# Patient Record
Sex: FEMALE | Race: WHITE | Employment: OTHER | URBAN - METROPOLITAN AREA
[De-identification: names, ages, dates, MRNs, and addresses within clinical notes are randomized per-mention and may not be internally consistent; named-entity substitution may affect disease eponyms.]

---

## 2017-01-12 PROBLEM — F17.200 TOBACCO DEPENDENCE: Status: ACTIVE | Noted: 2017-01-12

## 2018-01-29 ENCOUNTER — TELEPHONE (OUTPATIENT)
Dept: FAMILY MEDICINE CLINIC | Age: 33
End: 2018-01-29

## 2018-01-29 DIAGNOSIS — R19.5 LOOSE STOOLS: Primary | ICD-10-CM

## 2019-04-17 PROBLEM — R39.15 URGENCY OF URINATION: Status: ACTIVE | Noted: 2018-01-08

## 2019-04-17 PROBLEM — M62.89 HIGH-TONE PELVIC FLOOR DYSFUNCTION: Status: ACTIVE | Noted: 2018-02-26

## 2019-04-17 PROBLEM — N94.89 HIGH-TONE PELVIC FLOOR DYSFUNCTION: Status: ACTIVE | Noted: 2018-02-26

## 2019-04-17 PROBLEM — N39.46 MIXED STRESS AND URGE URINARY INCONTINENCE: Status: ACTIVE | Noted: 2018-02-26

## 2019-04-17 PROBLEM — N94.10 DYSPAREUNIA, FEMALE: Status: ACTIVE | Noted: 2018-01-08

## 2019-04-17 PROBLEM — R55 POSTURAL DIZZINESS WITH NEAR SYNCOPE: Status: ACTIVE | Noted: 2019-04-17

## 2019-04-17 PROBLEM — R35.0 FREQUENCY OF URINATION: Status: ACTIVE | Noted: 2018-03-29

## 2019-04-17 PROBLEM — R42 POSTURAL DIZZINESS WITH NEAR SYNCOPE: Status: ACTIVE | Noted: 2019-04-17

## 2019-04-17 PROBLEM — N39.0 RECURRENT URINARY TRACT INFECTION: Status: ACTIVE | Noted: 2018-01-30

## 2019-04-17 PROBLEM — R00.1 BRADYCARDIA: Status: ACTIVE | Noted: 2019-04-17

## 2019-04-18 PROBLEM — R00.1 SYMPTOMATIC SINUS BRADYCARDIA: Status: ACTIVE | Noted: 2019-04-18

## 2019-04-24 PROBLEM — Z95.0 S/P PLACEMENT OF CARDIAC PACEMAKER: Status: ACTIVE | Noted: 2019-04-24

## 2019-04-26 ENCOUNTER — TELEPHONE (OUTPATIENT)
Dept: FAMILY MEDICINE CLINIC | Age: 34
End: 2019-04-26

## 2019-06-11 ENCOUNTER — TELEPHONE (OUTPATIENT)
Dept: FAMILY MEDICINE CLINIC | Age: 34
End: 2019-06-11

## 2019-06-11 DIAGNOSIS — Z95.0 S/P PLACEMENT OF CARDIAC PACEMAKER: ICD-10-CM

## 2019-06-11 DIAGNOSIS — R00.1 BRADYCARDIA: ICD-10-CM

## 2019-06-11 DIAGNOSIS — K90.0 CELIAC DISEASE: Primary | ICD-10-CM

## 2019-10-08 PROBLEM — N93.9 ABNORMAL UTERINE BLEEDING: Status: RESOLVED | Noted: 2019-07-18 | Resolved: 2019-10-08

## 2019-10-08 PROBLEM — R35.0 FREQUENCY OF URINATION: Status: RESOLVED | Noted: 2018-03-29 | Resolved: 2019-10-08

## 2019-10-08 PROBLEM — R39.15 URGENCY OF URINATION: Chronic | Status: ACTIVE | Noted: 2018-01-08

## 2019-10-08 PROBLEM — Z87.410 HISTORY OF CERVICAL DYSPLASIA: Status: RESOLVED | Noted: 2019-07-18 | Resolved: 2019-10-08

## 2019-10-08 PROBLEM — Z95.0 PACEMAKER: Status: ACTIVE | Noted: 2019-06-27

## 2019-10-08 PROBLEM — R55 SYNCOPE AND COLLAPSE: Status: RESOLVED | Noted: 2019-04-17 | Resolved: 2019-10-08

## 2019-10-08 PROBLEM — R42 LIGHTHEADEDNESS: Status: RESOLVED | Noted: 2019-07-18 | Resolved: 2019-10-08

## 2019-10-08 PROBLEM — F32.A DEPRESSION: Status: ACTIVE | Noted: 2019-07-18

## 2019-10-08 PROBLEM — R42 POSTURAL DIZZINESS WITH NEAR SYNCOPE: Status: RESOLVED | Noted: 2019-04-17 | Resolved: 2019-10-08

## 2019-10-08 PROBLEM — R42 LIGHTHEADEDNESS: Status: ACTIVE | Noted: 2019-07-18

## 2019-10-08 PROBLEM — R55 POSTURAL DIZZINESS WITH NEAR SYNCOPE: Status: RESOLVED | Noted: 2019-04-17 | Resolved: 2019-10-08

## 2019-10-08 PROBLEM — Z87.410 HISTORY OF CERVICAL DYSPLASIA: Status: ACTIVE | Noted: 2019-07-18

## 2020-01-14 PROBLEM — F33.1 MAJOR DEPRESSIVE DISORDER, RECURRENT EPISODE, MODERATE (HCC): Status: ACTIVE | Noted: 2019-12-19

## 2020-01-14 PROBLEM — M62.89 HIGH-TONE PELVIC FLOOR DYSFUNCTION: Status: RESOLVED | Noted: 2018-02-26 | Resolved: 2020-01-14

## 2020-01-14 PROBLEM — N94.10 DYSPAREUNIA, FEMALE: Status: RESOLVED | Noted: 2018-01-08 | Resolved: 2020-01-14

## 2020-01-14 PROBLEM — N94.89 HIGH-TONE PELVIC FLOOR DYSFUNCTION: Status: RESOLVED | Noted: 2018-02-26 | Resolved: 2020-01-14

## 2020-01-14 PROBLEM — R31.29 HEMATURIA, MICROSCOPIC: Status: RESOLVED | Noted: 2018-01-30 | Resolved: 2020-01-14

## 2020-05-18 ENCOUNTER — TELEPHONE (OUTPATIENT)
Dept: FAMILY MEDICINE CLINIC | Age: 35
End: 2020-05-18

## 2020-06-22 PROBLEM — R56.9 SEIZURE-LIKE ACTIVITY (HCC): Status: ACTIVE | Noted: 2020-06-22

## 2020-06-25 ENCOUNTER — TELEPHONE (OUTPATIENT)
Dept: FAMILY MEDICINE CLINIC | Age: 35
End: 2020-06-25

## 2020-06-25 DIAGNOSIS — F12.90 MARIJUANA USE: ICD-10-CM

## 2020-06-25 RX ORDER — BUTALBITAL, ACETAMINOPHEN AND CAFFEINE 50; 325; 40 MG/1; MG/1; MG/1
1 TABLET ORAL EVERY 4 HOURS PRN
Qty: 20 TABLET | Refills: 0 | Status: SHIPPED | OUTPATIENT
Start: 2020-06-25 | End: 2020-07-31 | Stop reason: SDUPTHER

## 2020-07-08 PROBLEM — G37.9 DEMYELINATING DISORDER (HCC): Status: ACTIVE | Noted: 2020-07-08

## 2020-09-02 ENCOUNTER — TELEPHONE (OUTPATIENT)
Dept: FAMILY MEDICINE CLINIC | Age: 35
End: 2020-09-02

## 2020-09-02 RX ORDER — FLUCONAZOLE 100 MG/1
100 TABLET ORAL DAILY
Qty: 3 TABLET | Refills: 0 | Status: SHIPPED | OUTPATIENT
Start: 2020-09-02 | End: 2020-09-05

## 2023-07-05 ENCOUNTER — OFFICE VISIT (OUTPATIENT)
Dept: FAMILY MEDICINE CLINIC | Age: 38
End: 2023-07-05
Payer: MEDICARE

## 2023-07-05 VITALS
SYSTOLIC BLOOD PRESSURE: 106 MMHG | WEIGHT: 185.6 LBS | TEMPERATURE: 96.8 F | BODY MASS INDEX: 29.83 KG/M2 | HEIGHT: 66 IN | OXYGEN SATURATION: 94 % | DIASTOLIC BLOOD PRESSURE: 64 MMHG | HEART RATE: 95 BPM

## 2023-07-05 DIAGNOSIS — Z13.9 ENCOUNTER FOR SCREENING INVOLVING SOCIAL DETERMINANTS OF HEALTH (SDOH): ICD-10-CM

## 2023-07-05 DIAGNOSIS — Z00.00 INITIAL MEDICARE ANNUAL WELLNESS VISIT: Primary | ICD-10-CM

## 2023-07-05 DIAGNOSIS — Z95.0 PACEMAKER: ICD-10-CM

## 2023-07-05 DIAGNOSIS — G40.209 PARTIAL SYMPTOMATIC EPILEPSY WITH COMPLEX PARTIAL SEIZURES, NOT INTRACTABLE, WITHOUT STATUS EPILEPTICUS (HCC): ICD-10-CM

## 2023-07-05 DIAGNOSIS — F33.1 MAJOR DEPRESSIVE DISORDER, RECURRENT EPISODE, MODERATE (HCC): ICD-10-CM

## 2023-07-05 PROBLEM — I49.5 SICK SINUS SYNDROME (HCC): Status: RESOLVED | Noted: 2019-04-24 | Resolved: 2023-07-05

## 2023-07-05 PROBLEM — F31.60 BIPOLAR AFFECTIVE DISORDER, CURRENT EPISODE MIXED (HCC): Status: ACTIVE | Noted: 2021-03-16

## 2023-07-05 PROBLEM — R00.1 BRADYCARDIA: Status: RESOLVED | Noted: 2019-04-17 | Resolved: 2023-07-05

## 2023-07-05 PROBLEM — G37.9 DEMYELINATING DISORDER (HCC): Status: RESOLVED | Noted: 2020-07-08 | Resolved: 2023-07-05

## 2023-07-05 PROCEDURE — G0438 PPPS, INITIAL VISIT: HCPCS | Performed by: NURSE PRACTITIONER

## 2023-07-05 RX ORDER — VENLAFAXINE HYDROCHLORIDE 75 MG/1
CAPSULE, EXTENDED RELEASE ORAL
COMMUNITY
Start: 2023-06-22

## 2023-07-05 RX ORDER — VENLAFAXINE HYDROCHLORIDE 150 MG/1
CAPSULE, EXTENDED RELEASE ORAL
COMMUNITY
Start: 2023-06-27

## 2023-07-05 RX ORDER — OXCARBAZEPINE 300 MG/1
TABLET, FILM COATED ORAL
COMMUNITY
Start: 2023-06-22

## 2023-07-05 RX ORDER — LACOSAMIDE 50 MG/1
TABLET ORAL
COMMUNITY
Start: 2023-06-28

## 2023-07-05 RX ORDER — ARIPIPRAZOLE 15 MG/1
TABLET ORAL
COMMUNITY
Start: 2023-07-04

## 2023-07-05 SDOH — ECONOMIC STABILITY: HOUSING INSECURITY
IN THE LAST 12 MONTHS, WAS THERE A TIME WHEN YOU DID NOT HAVE A STEADY PLACE TO SLEEP OR SLEPT IN A SHELTER (INCLUDING NOW)?: NO

## 2023-07-05 SDOH — ECONOMIC STABILITY: INCOME INSECURITY: HOW HARD IS IT FOR YOU TO PAY FOR THE VERY BASICS LIKE FOOD, HOUSING, MEDICAL CARE, AND HEATING?: HARD

## 2023-07-05 SDOH — ECONOMIC STABILITY: FOOD INSECURITY: WITHIN THE PAST 12 MONTHS, YOU WORRIED THAT YOUR FOOD WOULD RUN OUT BEFORE YOU GOT MONEY TO BUY MORE.: NEVER TRUE

## 2023-07-05 SDOH — ECONOMIC STABILITY: FOOD INSECURITY: WITHIN THE PAST 12 MONTHS, THE FOOD YOU BOUGHT JUST DIDN'T LAST AND YOU DIDN'T HAVE MONEY TO GET MORE.: NEVER TRUE

## 2023-07-05 ASSESSMENT — PATIENT HEALTH QUESTIONNAIRE - PHQ9
8. MOVING OR SPEAKING SO SLOWLY THAT OTHER PEOPLE COULD HAVE NOTICED. OR THE OPPOSITE, BEING SO FIGETY OR RESTLESS THAT YOU HAVE BEEN MOVING AROUND A LOT MORE THAN USUAL: 0
1. LITTLE INTEREST OR PLEASURE IN DOING THINGS: 0
4. FEELING TIRED OR HAVING LITTLE ENERGY: 0
3. TROUBLE FALLING OR STAYING ASLEEP: 0
9. THOUGHTS THAT YOU WOULD BE BETTER OFF DEAD, OR OF HURTING YOURSELF: 0
6. FEELING BAD ABOUT YOURSELF - OR THAT YOU ARE A FAILURE OR HAVE LET YOURSELF OR YOUR FAMILY DOWN: 0
SUM OF ALL RESPONSES TO PHQ QUESTIONS 1-9: 0
7. TROUBLE CONCENTRATING ON THINGS, SUCH AS READING THE NEWSPAPER OR WATCHING TELEVISION: 0
2. FEELING DOWN, DEPRESSED OR HOPELESS: 0
SUM OF ALL RESPONSES TO PHQ QUESTIONS 1-9: 0
SUM OF ALL RESPONSES TO PHQ9 QUESTIONS 1 & 2: 0
5. POOR APPETITE OR OVEREATING: 0
10. IF YOU CHECKED OFF ANY PROBLEMS, HOW DIFFICULT HAVE THESE PROBLEMS MADE IT FOR YOU TO DO YOUR WORK, TAKE CARE OF THINGS AT HOME, OR GET ALONG WITH OTHER PEOPLE: 0
SUM OF ALL RESPONSES TO PHQ QUESTIONS 1-9: 0
SUM OF ALL RESPONSES TO PHQ QUESTIONS 1-9: 0

## 2023-07-05 ASSESSMENT — LIFESTYLE VARIABLES
HOW MANY STANDARD DRINKS CONTAINING ALCOHOL DO YOU HAVE ON A TYPICAL DAY: PATIENT DOES NOT DRINK
HOW OFTEN DO YOU HAVE A DRINK CONTAINING ALCOHOL: NEVER

## 2023-07-05 NOTE — PROGRESS NOTES
Visit Information    Have you changed or started any medications since your last visit including any over-the-counter medicines, vitamins, or herbal medicines? no   Have you stopped taking any of your medications? Is so, why? -  no  Are you having any side effects from any of your medications? - no    Have you seen any other physician or provider since your last visit?  no   Have you had any other diagnostic tests since your last visit?  no   Have you been seen in the emergency room and/or had an admission in a hospital since we last saw you?  no   Have you had your routine dental cleaning in the past 6 months?  no     Do you have an active MyChart account? If no, what is the barrier?   Yes    Patient Care Team:  Elia Kussmaul, PA-C as PCP - General  Elia Kussmaul, PA-C as PCP - Empaneled Provider    Medical History Review  Past Medical, Family, and Social History reviewed and  contribute to the patient presenting condition    Health Maintenance   Topic Date Due    COVID-19 Vaccine (1) Never done    Varicella vaccine (1 of 2 - 2-dose childhood series) Never done    Pneumococcal 0-64 years Vaccine (1 - PCV) Never done    DTaP/Tdap/Td vaccine (6 - Tdap) 10/18/1996    Depression Monitoring  Never done    Hepatitis C screen  Never done    Flu vaccine (1) 08/01/2023    Hib vaccine  Completed    HIV screen  Completed    Hepatitis A vaccine  Aged Out    Meningococcal (ACWY) vaccine  Aged Out    Hepatitis B vaccine  Discontinued    Cervical cancer screen  Discontinued

## 2023-07-05 NOTE — PATIENT INSTRUCTIONS
liability for your use of this information. Personalized Preventive Plan for Theodore Minus - 7/5/2023  Medicare offers a range of preventive health benefits. Some of the tests and screenings are paid in full while other may be subject to a deductible, co-insurance, and/or copay. Some of these benefits include a comprehensive review of your medical history including lifestyle, illnesses that may run in your family, and various assessments and screenings as appropriate. After reviewing your medical record and screening and assessments performed today your provider may have ordered immunizations, labs, imaging, and/or referrals for you. A list of these orders (if applicable) as well as your Preventive Care list are included within your After Visit Summary for your review. Other Preventive Recommendations:    A preventive eye exam performed by an eye specialist is recommended every 1-2 years to screen for glaucoma; cataracts, macular degeneration, and other eye disorders. A preventive dental visit is recommended every 6 months. Try to get at least 150 minutes of exercise per week or 10,000 steps per day on a pedometer . Order or download the FREE \"Exercise & Physical Activity: Your Everyday Guide\" from [employer]. Call 5-675.746.8386 or search [employer] online. You need 1386-1767 mg of calcium and 7164-4281 IU of vitamin D per day. It is possible to meet your calcium requirement with diet alone, but a vitamin D supplement is usually necessary to meet this goal.  When exposed to the sun, use a sunscreen that protects against both UVA and UVB radiation with an SPF of 30 or greater. Reapply every 2 to 3 hours or after sweating, drying off with a towel, or swimming. Always wear a seat belt when traveling in a car. Always wear a helmet when riding a bicycle or motorcycle.

## 2023-07-21 ENCOUNTER — PATIENT MESSAGE (OUTPATIENT)
Dept: FAMILY MEDICINE CLINIC | Age: 38
End: 2023-07-21

## 2023-07-22 NOTE — TELEPHONE ENCOUNTER
From: Cong Self  To: Angela Lundborg  Sent: 7/21/2023 9:47 PM EDT  Subject: Head itching     Hello. I have been having scalp itching for over a month. I have tried Head & Shoulders and not sure what else to try.  Thank you

## 2023-08-02 ENCOUNTER — OFFICE VISIT (OUTPATIENT)
Dept: FAMILY MEDICINE CLINIC | Age: 38
End: 2023-08-02
Payer: MEDICARE

## 2023-08-02 ENCOUNTER — HOSPITAL ENCOUNTER (OUTPATIENT)
Age: 38
Setting detail: SPECIMEN
Discharge: HOME | End: 2023-08-02

## 2023-08-02 VITALS
HEIGHT: 66 IN | WEIGHT: 182 LBS | DIASTOLIC BLOOD PRESSURE: 66 MMHG | TEMPERATURE: 97 F | OXYGEN SATURATION: 95 % | BODY MASS INDEX: 29.25 KG/M2 | SYSTOLIC BLOOD PRESSURE: 110 MMHG | HEART RATE: 74 BPM

## 2023-08-02 DIAGNOSIS — Z12.4 PAP SMEAR FOR CERVICAL CANCER SCREENING: Primary | ICD-10-CM

## 2023-08-02 DIAGNOSIS — N76.0 ACUTE VAGINITIS: ICD-10-CM

## 2023-08-02 LAB
CANDIDA SPECIES: NEGATIVE
GARDNERELLA VAGINALIS: NEGATIVE
SOURCE: NORMAL
TRICHOMONAS: NEGATIVE

## 2023-08-02 PROCEDURE — G0101 CA SCREEN;PELVIC/BREAST EXAM: HCPCS | Performed by: PHYSICIAN ASSISTANT

## 2023-08-02 NOTE — PROGRESS NOTES
Visit Information    Have you changed or started any medications since your last visit including any over-the-counter medicines, vitamins, or herbal medicines? no   Are you having any side effects from any of your medications? -  no  Have you stopped taking any of your medications? Is so, why? -  no    Have you seen any other physician or provider since your last visit? No  Have you had any other diagnostic tests since your last visit? No  Have you been seen in the emergency room and/or had an admission to a hospital since we last saw you? No  Have you had your routine dental cleaning in the past 6 months? no    Have you activated your [employer] account? If not, what are your barriers?  Yes     Patient Care Team:  Mariya Dave PA-C as PCP - General  Mariya Dave PA-C as PCP - Empaneled Provider    Medical History Review  Past Medical, Family, and Social History reviewed and  contribute to the patient presenting condition    Health Maintenance   Topic Date Due    COVID-19 Vaccine (1) Never done    Varicella vaccine (1 of 2 - 2-dose childhood series) Never done    Pneumococcal 0-64 years Vaccine (1 - PCV) Never done    DTaP/Tdap/Td vaccine (6 - Tdap) 10/18/1996    Hepatitis C screen  Never done    Diabetes screen  10/18/2020    Flu vaccine (1) 08/01/2023    Depression Monitoring  07/05/2024    Hib vaccine  Completed    HIV screen  Completed    Hepatitis A vaccine  Aged Out    Meningococcal (ACWY) vaccine  Aged Out    Hepatitis B vaccine  Discontinued    Cervical cancer screen  Discontinued

## 2023-08-02 NOTE — PROGRESS NOTES
[address]  [address] [place], [address] [place] [address]  [phone]      Elian Lee is a 40 y.o. female who presents today for her  medical conditions/complaints as noted below. Elian Lee is c/o of Gynecologic Exam  .    HPI:     HPI    GYN History:  Menstrual Hx:       DOLP: partial hysterectomy per patient no cervix 2016- hx of heavy bleeding  ST1 Hx: no  Ectopic pregnancy Hx:no  Sexually Active:  yes  Dyspareunia: no  New partner 2 years ago. No cultures since. Past Medical History:   Diagnosis Date    Abnormal uterine bleeding 7/18/2019    Anxiety     Asthma     Bradycardia     Broken foot 05/2016    LEFT    Chronic fatigue     Demyelinating disorder (HCC)     Depression     Excessive daytime sleepiness     Headache     History of cervical dysplasia 7/18/2019    Incontinence     Insomnia     unspecified type    Marijuana use     MEDICAL MARIJUANA    Neuropathy     feet    Peripheral polyneuropathy     Postural dizziness with near syncope 4/17/2019    Radial head fracture 12/12/2013    Raynaud's phenomenon     Dr. Walter Yang    Seizures [facility])     last seizure?  3-2019    Suspected sleep apnea     Syncopal episodes     Syncope and collapse 4/17/2019    Tobacco dependence       Past Surgical History:   Procedure Laterality Date    CARDIAC PACEMAKER PLACEMENT  04/24/2019    DR Hardik Esquivel    DILATION AND CURETTAGE OF UTERUS      HYSTERECTOMY (CERVIX STATUS UNKNOWN)      partial 2016    PACEMAKER PLACEMENT      TUBAL LIGATION       Family History   Problem Relation Age of Onset    High Blood Pressure Father     High Blood Pressure Brother      Social History     Tobacco Use    Smoking status: Every Day     Types: E-Cigarettes, Cigarettes     Start date: 1/1/2016    Smokeless tobacco: Never   Substance Use Topics    Alcohol use: Not Currently      Current Outpatient Medications   Medication Sig Dispense Refill    aluminum chloride (DRYSOL) 20 % external solution Apply topically

## 2023-08-03 LAB
C TRACH DNA SPEC QL PROBE+SIG AMP: NEGATIVE
N GONORRHOEA DNA SPEC QL PROBE+SIG AMP: NEGATIVE
SPECIMEN DESCRIPTION: NORMAL

## 2023-08-09 LAB — CYTOLOGY REPORT: NORMAL

## 2024-04-26 ENCOUNTER — E-VISIT (OUTPATIENT)
Dept: FAMILY MEDICINE CLINIC | Age: 39
End: 2024-04-26
Payer: MEDICARE

## 2024-04-26 DIAGNOSIS — N76.0 ACUTE VAGINITIS: Primary | ICD-10-CM

## 2024-04-26 PROCEDURE — 99421 OL DIG E/M SVC 5-10 MIN: CPT | Performed by: PHYSICIAN ASSISTANT

## 2024-04-26 RX ORDER — FLUCONAZOLE 100 MG/1
100 TABLET ORAL DAILY
Qty: 3 TABLET | Refills: 0 | Status: SHIPPED | OUTPATIENT
Start: 2024-04-26 | End: 2024-04-29

## 2024-04-26 NOTE — PROGRESS NOTES
HPI e-visit  ROS e-visit  Exam deferred   Diagnosis Orders   1. Acute vaginitis  fluconazole (DIFLUCAN) 100 MG tablet        Start diflucan  Since new partner is involved if symptoms persisting/worsening should be seen in person for cultures to rule out other causes  5-10 min spent on visit.

## 2024-08-29 ENCOUNTER — OFFICE VISIT (OUTPATIENT)
Dept: FAMILY MEDICINE CLINIC | Age: 39
End: 2024-08-29
Payer: MEDICARE

## 2024-08-29 VITALS
BODY MASS INDEX: 30.05 KG/M2 | HEIGHT: 66 IN | HEART RATE: 91 BPM | WEIGHT: 187 LBS | SYSTOLIC BLOOD PRESSURE: 122 MMHG | DIASTOLIC BLOOD PRESSURE: 72 MMHG | TEMPERATURE: 97.4 F | OXYGEN SATURATION: 97 %

## 2024-08-29 DIAGNOSIS — Z11.59 NEED FOR HEPATITIS C SCREENING TEST: ICD-10-CM

## 2024-08-29 DIAGNOSIS — G47.10 HYPERSOMNIA: ICD-10-CM

## 2024-08-29 DIAGNOSIS — G40.209 PARTIAL SYMPTOMATIC EPILEPSY WITH COMPLEX PARTIAL SEIZURES, NOT INTRACTABLE, WITHOUT STATUS EPILEPTICUS (HCC): ICD-10-CM

## 2024-08-29 DIAGNOSIS — Z13.220 SCREENING, LIPID: ICD-10-CM

## 2024-08-29 DIAGNOSIS — R23.2 HOT FLASHES: ICD-10-CM

## 2024-08-29 DIAGNOSIS — Z13.89 SCREENING FOR SKIN CONDITION: ICD-10-CM

## 2024-08-29 DIAGNOSIS — Z00.00 MEDICARE ANNUAL WELLNESS VISIT, SUBSEQUENT: Primary | ICD-10-CM

## 2024-08-29 DIAGNOSIS — F31.60 BIPOLAR AFFECTIVE DISORDER, CURRENT EPISODE MIXED, CURRENT EPISODE SEVERITY UNSPECIFIED (HCC): ICD-10-CM

## 2024-08-29 DIAGNOSIS — F33.1 MAJOR DEPRESSIVE DISORDER, RECURRENT EPISODE, MODERATE (HCC): ICD-10-CM

## 2024-08-29 DIAGNOSIS — R06.83 SNORING: ICD-10-CM

## 2024-08-29 DIAGNOSIS — Z13.1 SCREENING FOR DIABETES MELLITUS: ICD-10-CM

## 2024-08-29 PROCEDURE — 4004F PT TOBACCO SCREEN RCVD TLK: CPT | Performed by: PHYSICIAN ASSISTANT

## 2024-08-29 PROCEDURE — G0439 PPPS, SUBSEQ VISIT: HCPCS | Performed by: PHYSICIAN ASSISTANT

## 2024-08-29 PROCEDURE — G8419 CALC BMI OUT NRM PARAM NOF/U: HCPCS | Performed by: PHYSICIAN ASSISTANT

## 2024-08-29 PROCEDURE — G8427 DOCREV CUR MEDS BY ELIG CLIN: HCPCS | Performed by: PHYSICIAN ASSISTANT

## 2024-08-29 PROCEDURE — 99213 OFFICE O/P EST LOW 20 MIN: CPT | Performed by: PHYSICIAN ASSISTANT

## 2024-08-29 SDOH — ECONOMIC STABILITY: INCOME INSECURITY: HOW HARD IS IT FOR YOU TO PAY FOR THE VERY BASICS LIKE FOOD, HOUSING, MEDICAL CARE, AND HEATING?: NOT HARD AT ALL

## 2024-08-29 SDOH — ECONOMIC STABILITY: FOOD INSECURITY: WITHIN THE PAST 12 MONTHS, THE FOOD YOU BOUGHT JUST DIDN'T LAST AND YOU DIDN'T HAVE MONEY TO GET MORE.: NEVER TRUE

## 2024-08-29 SDOH — ECONOMIC STABILITY: FOOD INSECURITY: WITHIN THE PAST 12 MONTHS, YOU WORRIED THAT YOUR FOOD WOULD RUN OUT BEFORE YOU GOT MONEY TO BUY MORE.: NEVER TRUE

## 2024-08-29 ASSESSMENT — PATIENT HEALTH QUESTIONNAIRE - PHQ9
SUM OF ALL RESPONSES TO PHQ QUESTIONS 1-9: 0
10. IF YOU CHECKED OFF ANY PROBLEMS, HOW DIFFICULT HAVE THESE PROBLEMS MADE IT FOR YOU TO DO YOUR WORK, TAKE CARE OF THINGS AT HOME, OR GET ALONG WITH OTHER PEOPLE: NOT DIFFICULT AT ALL
1. LITTLE INTEREST OR PLEASURE IN DOING THINGS: NOT AT ALL
SUM OF ALL RESPONSES TO PHQ QUESTIONS 1-9: 0
5. POOR APPETITE OR OVEREATING: NOT AT ALL
SUM OF ALL RESPONSES TO PHQ QUESTIONS 1-9: 0
8. MOVING OR SPEAKING SO SLOWLY THAT OTHER PEOPLE COULD HAVE NOTICED. OR THE OPPOSITE, BEING SO FIGETY OR RESTLESS THAT YOU HAVE BEEN MOVING AROUND A LOT MORE THAN USUAL: NOT AT ALL
4. FEELING TIRED OR HAVING LITTLE ENERGY: NOT AT ALL
9. THOUGHTS THAT YOU WOULD BE BETTER OFF DEAD, OR OF HURTING YOURSELF: NOT AT ALL
2. FEELING DOWN, DEPRESSED OR HOPELESS: NOT AT ALL
6. FEELING BAD ABOUT YOURSELF - OR THAT YOU ARE A FAILURE OR HAVE LET YOURSELF OR YOUR FAMILY DOWN: NOT AT ALL
3. TROUBLE FALLING OR STAYING ASLEEP: NOT AT ALL
SUM OF ALL RESPONSES TO PHQ9 QUESTIONS 1 & 2: 0
7. TROUBLE CONCENTRATING ON THINGS, SUCH AS READING THE NEWSPAPER OR WATCHING TELEVISION: NOT AT ALL
SUM OF ALL RESPONSES TO PHQ QUESTIONS 1-9: 0

## 2024-08-29 ASSESSMENT — LIFESTYLE VARIABLES
HOW OFTEN DO YOU HAVE A DRINK CONTAINING ALCOHOL: MONTHLY OR LESS
HOW MANY STANDARD DRINKS CONTAINING ALCOHOL DO YOU HAVE ON A TYPICAL DAY: 1 OR 2

## 2024-08-29 NOTE — PROGRESS NOTES
Medicare Annual Wellness Visit    Paola Austin is here for Medicare AWV, Nail Problem (Bruising on big toe nails), and night sweats    Assessment & Plan   Medicare annual wellness visit, subsequent  Snoring  -     Baseline Diagnostic Sleep Study; Future  Hypersomnia  -     Baseline Diagnostic Sleep Study; Future  Bipolar affective disorder, current episode mixed, current episode severity unspecified (HCC)  -     TSH with Reflex; Future  Major depressive disorder, recurrent episode, moderate (HCC)  -     TSH with Reflex; Future  Partial symptomatic epilepsy with complex partial seizures, not intractable, without status epilepticus (HCC)  Screening for skin condition  -     AFL - Toby Renee MD, Dermatology, [place]  Hot flashes  -     Follicle Stimulating Hormone; Future  -     Estrogens, Fractionated; Future  -     CBC with Auto Differential; Future  -     TSH with Reflex; Future  Screening, lipid  -     Lipid Panel; Future  Screening for diabetes mellitus  -     Comprehensive Metabolic Panel; Future  -     Hemoglobin A1C; Future  Need for hepatitis C screening test  -     Hepatitis C Antibody; Future    Recommend healthy diet-low carb/calorie diet, healthy whole foods.    Regular exercise encouraged. Recommendation for 150min of exercise a week. Can be divided however convenient.  Recommend healthy sleep habit. Try and go to bed at the same time and wake up at the same time for the most restfull pattern.   Also recommend regular healthy fluid intake. Water is the best at hydrating us. Encourage minimal caffeine and pop/soda use  Update screening labs.  Also included FSH and estrogen due to hot flashes.  Recommended dermatology evaluation for screening purposes, referral given.  Continue all specialists.  Advised update vaccines this fall at local pharmacy.  Sleep study ordered to evaluate hypersomnia  Await all results  Patient agreed with treatment plan  Recommendations for Preventive Services Due: see  nodules, no cervical lymphadenopathy  Pulmonary/Chest: clear to auscultation bilaterally- no wheezes, rales or rhonchi, normal air movement, no respiratory distress  Cardiovascular: normal rate, regular rhythm, normal S1 and S2, no murmurs, rubs, clicks, or gallops, distal pulses intact, no carotid bruits  Abdomen: soft, non-tender, non-distended, normal bowel sounds, no masses or organomegaly  Extremities: no edema  Musculoskeletal: normal gait         No Known Allergies  Prior to Visit Medications    Medication Sig Taking? Authorizing Provider   aluminum chloride (DRYSOL) 20 % external solution Apply topically nightly. Yes Yamilka Larry PA-C   ARIPiprazole (ABILIFY) 15 MG tablet  Yes Alpesh Hastings MD   lacosamide (VIMPAT) 50 MG TABS tablet  Yes Alpesh Hastings MD   OXcarbazepine (TRILEPTAL) 300 MG tablet  Yes ProviderAlpesh MD   venlafaxine (EFFEXOR XR) 150 MG extended release capsule  Yes Alpesh Hastings MD   venlafaxine (EFFEXOR XR) 75 MG extended release capsule  Yes ProviderAlpesh MD   albuterol sulfate HFA (VENTOLIN HFA) 108 (90 Base) MCG/ACT inhaler Inhale 2 puffs into the lungs every 6 hours as needed for Wheezing Yes Yamilka Larry PA-C   medical marijuana Take 1 each by mouth as needed. Yes ProviderAlpesh MD CareTeam (Including outside providers/suppliers regularly involved in providing care):   Patient Care Team:  Yamilka Larry PA-C as PCP - General  Yamilka Larry PA-C as PCP - Empaneled Provider      Reviewed and updated this visit:  Tobacco  Allergies  Meds  Problems  Med Hx  Surg Hx  Soc Hx  Fam Hx

## 2024-08-29 NOTE — PROGRESS NOTES
Visit Information    Have you changed or started any medications since your last visit including any over-the-counter medicines, vitamins, or herbal medicines? no   Are you having any side effects from any of your medications? -  no  Have you stopped taking any of your medications? Is so, why? -  no    Have you seen any other physician or provider since your last visit? No  Have you had any other diagnostic tests since your last visit? No  Have you been seen in the emergency room and/or had an admission to a hospital since we last saw you? No  Have you had your routine dental cleaning in the past 6 months? no    Have you activated your [employer] account? If not, what are your barriers? No:      Patient Care Team:  Yamilka Larry PA-C as PCP - General  Yamilka Larry PA-C as PCP - Empaneled Provider    Medical History Review  Past Medical, Family, and Social History reviewed and  contribute to the patient presenting condition    Health Maintenance   Topic Date Due    Pneumococcal 0-64 years Vaccine (1 of 2 - PCV) Never done    DTaP/Tdap/Td vaccine (6 - Tdap) 10/18/1996    Varicella vaccine (1 of 2 - 13+ 2-dose series) Never done    Hepatitis B vaccine (2 of 3 - 3-dose series) 04/11/2000    Hepatitis C screen  Never done    COVID-19 Vaccine (1 - 2023-24 season) Never done    Depression Monitoring  07/05/2024    Annual Wellness Visit (Medicare)  07/05/2024    Flu vaccine (1) 08/01/2024    Hib vaccine  Completed    HIV screen  Completed    Hepatitis A vaccine  Aged Out    HPV vaccine  Aged Out    Polio vaccine  Aged Out    Meningococcal (ACWY) vaccine  Aged Out    Diabetes screen  Discontinued    Cervical cancer screen  Discontinued

## 2024-09-04 ENCOUNTER — HOSPITAL ENCOUNTER (OUTPATIENT)
Dept: SLEEP CENTER | Age: 39
Discharge: HOME | End: 2024-09-06
Payer: MEDICARE

## 2024-09-04 VITALS — HEIGHT: 66 IN | BODY MASS INDEX: 29.73 KG/M2 | WEIGHT: 185 LBS

## 2024-09-04 DIAGNOSIS — R06.83 SNORING: ICD-10-CM

## 2024-09-04 DIAGNOSIS — G47.10 HYPERSOMNIA: ICD-10-CM

## 2024-09-04 PROCEDURE — 95810 POLYSOM 6/> YRS 4/> PARAM: CPT

## 2024-09-15 LAB — STATUS: NORMAL

## 2024-09-16 DIAGNOSIS — G47.33 OSA (OBSTRUCTIVE SLEEP APNEA): Primary | ICD-10-CM

## 2024-11-14 ENCOUNTER — HOSPITAL ENCOUNTER (OUTPATIENT)
Dept: SLEEP CENTER | Age: 39
Discharge: HOME | End: 2024-11-16
Payer: MEDICARE

## 2024-11-14 DIAGNOSIS — G47.33 OSA (OBSTRUCTIVE SLEEP APNEA): ICD-10-CM

## 2024-11-14 PROCEDURE — 95811 POLYSOM 6/>YRS CPAP 4/> PARM: CPT

## 2024-11-15 VITALS
HEIGHT: 66 IN | RESPIRATION RATE: 16 BRPM | OXYGEN SATURATION: 96 % | WEIGHT: 185 LBS | HEART RATE: 73 BPM | BODY MASS INDEX: 29.73 KG/M2

## 2024-11-19 LAB — STATUS: NORMAL

## 2024-11-20 DIAGNOSIS — G47.33 OSA (OBSTRUCTIVE SLEEP APNEA): Primary | ICD-10-CM

## 2025-02-14 ENCOUNTER — TRANSCRIBE ORDERS (OUTPATIENT)
Dept: ADMINISTRATIVE | Age: 40
End: 2025-02-14

## 2025-02-14 DIAGNOSIS — R05.1 ACUTE COUGH: Primary | ICD-10-CM

## 2025-04-10 ENCOUNTER — HOSPITAL ENCOUNTER (OUTPATIENT)
Dept: PULMONOLOGY | Age: 40
Discharge: HOME | End: 2025-04-10
Attending: INTERNAL MEDICINE
Payer: MEDICARE

## 2025-04-10 DIAGNOSIS — R05.1 ACUTE COUGH: ICD-10-CM

## 2025-04-10 LAB
DLCO %PRED: NORMAL
DLCO PRED: NORMAL
DLCO/VA %PRED: NORMAL
DLCO/VA PRED: NORMAL
DLCO/VA: NORMAL
DLCO: NORMAL
EXPIRATORY TIME: NORMAL
FEF 25-75% %PRED-PRE: NORMAL
FEF 25-75% PRED: NORMAL
FEF 25-75-PRE: NORMAL
FEV1 %PRED-PRE: NORMAL
FEV1 PRED: NORMAL
FEV1/FVC %PRED-PRE: NORMAL
FEV1/FVC PRED: NORMAL
FEV1/FVC: NORMAL
FEV1: NORMAL
FVC %PRED-PRE: NORMAL
FVC PRED: NORMAL
FVC: NORMAL
GAW %PRED: NORMAL
GAW PRED: NORMAL
GAW: NORMAL
IC PRE %PRED: NORMAL
IC PRED: NORMAL
IC: NORMAL
MVV %PRED-PRE: NORMAL
MVV PRED: NORMAL
MVV-PRE: NORMAL
PEF %PRED-PRE: NORMAL
PEF PRED: NORMAL
PEF-PRE: NORMAL
RAW %PRED: NORMAL
RAW PRED: NORMAL
RAW: NORMAL
RV PRE %PRED: NORMAL
RV PRED: NORMAL
RV: NORMAL
SVC %PRED: NORMAL
SVC PRED: NORMAL
SVC: NORMAL
TLC PRE %PRED: NORMAL
TLC PRED: NORMAL
TLC: NORMAL
VA %PRED: NORMAL
VA PRED: NORMAL
VA: NORMAL
VTG %PRED: NORMAL
VTG PRED: NORMAL
VTG: NORMAL

## 2025-04-10 PROCEDURE — 94060 EVALUATION OF WHEEZING: CPT

## 2025-04-10 PROCEDURE — 94729 DIFFUSING CAPACITY: CPT

## 2025-04-10 PROCEDURE — 6370000000 HC RX 637 (ALT 250 FOR IP): Performed by: INTERNAL MEDICINE

## 2025-04-10 PROCEDURE — 94726 PLETHYSMOGRAPHY LUNG VOLUMES: CPT

## 2025-04-10 RX ORDER — ALBUTEROL SULFATE 90 UG/1
2 INHALANT RESPIRATORY (INHALATION) ONCE
Status: COMPLETED | OUTPATIENT
Start: 2025-04-10 | End: 2025-04-10

## 2025-04-10 RX ADMIN — ALBUTEROL SULFATE 2 PUFF: 90 AEROSOL, METERED RESPIRATORY (INHALATION) at 17:08

## 2025-04-10 NOTE — PROCEDURES
Impression/ Recommendations:    Severe obstructive pulmonary disease with significant air trapping, hyperinflation, and normal diffusion.  Significant improvement in flows was noted after bronchodilators.  Clinical correlation is recommended.        Francisco Delarosa MD  Pulmonary critical care and sleep

## 2025-04-15 RX ORDER — ALBUTEROL SULFATE 90 UG/1
2 INHALANT RESPIRATORY (INHALATION) EVERY 6 HOURS PRN
Qty: 1 EACH | Refills: 0 | Status: SHIPPED | OUTPATIENT
Start: 2025-04-15

## 2025-04-15 NOTE — TELEPHONE ENCOUNTER
Paola Austin is calling to request a refill on the following medication(s):    Last Visit Date (If Applicable):  8/29/2024    Next Visit Date:    Visit date not found    Medication Request:  Requested Prescriptions     Pending Prescriptions Disp Refills    aluminum chloride (DRYSOL) 20 % external solution 1 each 3     Sig: Apply topically nightly.    albuterol sulfate HFA (VENTOLIN HFA) 108 (90 Base) MCG/ACT inhaler 1 each 0     Sig: Inhale 2 puffs into the lungs every 6 hours as needed for Wheezing

## 2025-04-23 ENCOUNTER — PATIENT MESSAGE (OUTPATIENT)
Dept: FAMILY MEDICINE CLINIC | Age: 40
End: 2025-04-23

## 2025-04-23 RX ORDER — FLUCONAZOLE 100 MG/1
100 TABLET ORAL DAILY
Qty: 3 TABLET | Refills: 0 | Status: SHIPPED | OUTPATIENT
Start: 2025-04-23 | End: 2025-04-26

## 2025-04-24 ENCOUNTER — TRANSCRIBE ORDERS (OUTPATIENT)
Dept: ADMISSION | Age: 40
End: 2025-04-24

## 2025-04-24 ENCOUNTER — HOSPITAL ENCOUNTER (OUTPATIENT)
Dept: GENERAL RADIOLOGY | Age: 40
Discharge: HOME | End: 2025-04-26
Payer: MEDICARE

## 2025-04-24 DIAGNOSIS — R05.1 ACUTE COUGH: ICD-10-CM

## 2025-04-24 DIAGNOSIS — R05.1 ACUTE COUGH: Primary | ICD-10-CM

## 2025-04-24 PROCEDURE — 71046 X-RAY EXAM CHEST 2 VIEWS: CPT

## 2025-04-30 ENCOUNTER — OFFICE VISIT (OUTPATIENT)
Dept: FAMILY MEDICINE CLINIC | Age: 40
End: 2025-04-30
Payer: MEDICARE

## 2025-04-30 VITALS
OXYGEN SATURATION: 94 % | SYSTOLIC BLOOD PRESSURE: 128 MMHG | DIASTOLIC BLOOD PRESSURE: 74 MMHG | TEMPERATURE: 97.8 F | WEIGHT: 182 LBS | BODY MASS INDEX: 29.38 KG/M2 | HEART RATE: 104 BPM

## 2025-04-30 DIAGNOSIS — F31.60 BIPOLAR AFFECTIVE DISORDER, CURRENT EPISODE MIXED, CURRENT EPISODE SEVERITY UNSPECIFIED (HCC): ICD-10-CM

## 2025-04-30 DIAGNOSIS — R05.1 ACUTE COUGH: ICD-10-CM

## 2025-04-30 DIAGNOSIS — J40 BRONCHITIS: Primary | ICD-10-CM

## 2025-04-30 DIAGNOSIS — G40.209 PARTIAL SYMPTOMATIC EPILEPSY WITH COMPLEX PARTIAL SEIZURES, NOT INTRACTABLE, WITHOUT STATUS EPILEPTICUS (HCC): ICD-10-CM

## 2025-04-30 DIAGNOSIS — R09.89 CHEST CONGESTION: ICD-10-CM

## 2025-04-30 PROCEDURE — 4004F PT TOBACCO SCREEN RCVD TLK: CPT | Performed by: FAMILY MEDICINE

## 2025-04-30 PROCEDURE — G8417 CALC BMI ABV UP PARAM F/U: HCPCS | Performed by: FAMILY MEDICINE

## 2025-04-30 PROCEDURE — 99213 OFFICE O/P EST LOW 20 MIN: CPT | Performed by: FAMILY MEDICINE

## 2025-04-30 PROCEDURE — G8427 DOCREV CUR MEDS BY ELIG CLIN: HCPCS | Performed by: FAMILY MEDICINE

## 2025-04-30 RX ORDER — HYDROXYZINE HYDROCHLORIDE 10 MG/1
TABLET, FILM COATED ORAL
COMMUNITY
Start: 2024-09-12 | End: 2026-01-10

## 2025-04-30 RX ORDER — METHYLPREDNISOLONE 4 MG/1
TABLET ORAL
Qty: 1 KIT | Refills: 0 | Status: SHIPPED | OUTPATIENT
Start: 2025-04-30 | End: 2025-05-06

## 2025-04-30 RX ORDER — GUAIFENESIN 600 MG/1
600 TABLET, EXTENDED RELEASE ORAL 2 TIMES DAILY
Qty: 30 TABLET | Refills: 0 | Status: SHIPPED | OUTPATIENT
Start: 2025-04-30 | End: 2025-05-15

## 2025-04-30 RX ORDER — GABAPENTIN 300 MG/1
CAPSULE ORAL
COMMUNITY
Start: 2025-04-17

## 2025-04-30 RX ORDER — SULFAMETHOXAZOLE AND TRIMETHOPRIM 800; 160 MG/1; MG/1
1 TABLET ORAL 2 TIMES DAILY
Qty: 20 TABLET | Refills: 0 | Status: SHIPPED | OUTPATIENT
Start: 2025-04-30 | End: 2025-05-10

## 2025-04-30 RX ORDER — LEVETIRACETAM 1000 MG/1
TABLET ORAL
COMMUNITY
Start: 2025-04-24

## 2025-04-30 RX ORDER — LEVETIRACETAM 750 MG/1
TABLET ORAL
COMMUNITY
Start: 2025-03-20 | End: 2025-04-30

## 2025-04-30 RX ORDER — BENZONATATE 200 MG/1
200 CAPSULE ORAL 3 TIMES DAILY PRN
Qty: 30 CAPSULE | Refills: 0 | Status: SHIPPED | OUTPATIENT
Start: 2025-04-30 | End: 2025-05-10

## 2025-04-30 SDOH — ECONOMIC STABILITY: FOOD INSECURITY: WITHIN THE PAST 12 MONTHS, YOU WORRIED THAT YOUR FOOD WOULD RUN OUT BEFORE YOU GOT MONEY TO BUY MORE.: NEVER TRUE

## 2025-04-30 SDOH — ECONOMIC STABILITY: FOOD INSECURITY: WITHIN THE PAST 12 MONTHS, THE FOOD YOU BOUGHT JUST DIDN'T LAST AND YOU DIDN'T HAVE MONEY TO GET MORE.: NEVER TRUE

## 2025-04-30 ASSESSMENT — PATIENT HEALTH QUESTIONNAIRE - PHQ9
SUM OF ALL RESPONSES TO PHQ QUESTIONS 1-9: 2
2. FEELING DOWN, DEPRESSED OR HOPELESS: NOT AT ALL
SUM OF ALL RESPONSES TO PHQ QUESTIONS 1-9: 2
SUM OF ALL RESPONSES TO PHQ QUESTIONS 1-9: 2
3. TROUBLE FALLING OR STAYING ASLEEP: NOT AT ALL
SUM OF ALL RESPONSES TO PHQ QUESTIONS 1-9: 2
1. LITTLE INTEREST OR PLEASURE IN DOING THINGS: NOT AT ALL
8. MOVING OR SPEAKING SO SLOWLY THAT OTHER PEOPLE COULD HAVE NOTICED. OR THE OPPOSITE, BEING SO FIGETY OR RESTLESS THAT YOU HAVE BEEN MOVING AROUND A LOT MORE THAN USUAL: NOT AT ALL
6. FEELING BAD ABOUT YOURSELF - OR THAT YOU ARE A FAILURE OR HAVE LET YOURSELF OR YOUR FAMILY DOWN: NOT AT ALL
5. POOR APPETITE OR OVEREATING: SEVERAL DAYS
10. IF YOU CHECKED OFF ANY PROBLEMS, HOW DIFFICULT HAVE THESE PROBLEMS MADE IT FOR YOU TO DO YOUR WORK, TAKE CARE OF THINGS AT HOME, OR GET ALONG WITH OTHER PEOPLE: NOT DIFFICULT AT ALL
9. THOUGHTS THAT YOU WOULD BE BETTER OFF DEAD, OR OF HURTING YOURSELF: NOT AT ALL
4. FEELING TIRED OR HAVING LITTLE ENERGY: SEVERAL DAYS
7. TROUBLE CONCENTRATING ON THINGS, SUCH AS READING THE NEWSPAPER OR WATCHING TELEVISION: NOT AT ALL

## 2025-04-30 ASSESSMENT — ENCOUNTER SYMPTOMS
GASTROINTESTINAL NEGATIVE: 1
EYES NEGATIVE: 1
COUGH: 1
ALLERGIC/IMMUNOLOGIC NEGATIVE: 1

## 2025-04-30 NOTE — PROGRESS NOTES
Disp-30 capsule, R-0Normal  2. Chest congestion  -     methylPREDNISolone (MEDROL DOSEPACK) 4 MG tablet; Take by mouth as directed on packaging, Disp-1 kit, R-0Normal  -     guaiFENesin (MUCINEX) 600 MG extended release tablet; Take 1 tablet by mouth 2 times daily for 15 days, Disp-30 tablet, R-0Normal  3. Acute cough  -     benzonatate (TESSALON) 200 MG capsule; Take 1 capsule by mouth 3 times daily as needed for Cough, Disp-30 capsule, R-0Normal  4. Bipolar affective disorder, current episode mixed, current episode severity unspecified (HCC)  Comments:  Monitored by specialist- no acute findings meriting change in the plan  5. Partial symptomatic epilepsy with complex partial seizures, not intractable, without status epilepticus (HCC)  Comments:  Monitored by specialist- no acute findings meriting change in the plan  6. BMI 29.0-29.9,adult       Return in about 3 months (around 8/4/2025) for Medicare Annual Visit with GIOVANNA Larry.  AVS provided.      Electronically signed by Kevin Woodson DO on 4/30/2025 at 1:42 PM

## 2025-05-08 RX ORDER — ALBUTEROL SULFATE 90 UG/1
2 INHALANT RESPIRATORY (INHALATION) EVERY 6 HOURS PRN
Qty: 8.5 G | Refills: 1 | Status: SHIPPED | OUTPATIENT
Start: 2025-05-08

## 2025-06-23 RX ORDER — ALBUTEROL SULFATE 90 UG/1
2 INHALANT RESPIRATORY (INHALATION) EVERY 6 HOURS PRN
Qty: 8.5 G | Refills: 1 | Status: SHIPPED | OUTPATIENT
Start: 2025-06-23

## 2025-08-07 RX ORDER — ALBUTEROL SULFATE 90 UG/1
2 INHALANT RESPIRATORY (INHALATION) EVERY 6 HOURS PRN
Qty: 8.5 G | Refills: 1 | Status: SHIPPED | OUTPATIENT
Start: 2025-08-07

## 2025-09-05 ENCOUNTER — OFFICE VISIT (OUTPATIENT)
Age: 40
End: 2025-09-05

## 2025-09-05 VITALS
TEMPERATURE: 98.3 F | WEIGHT: 190 LBS | RESPIRATION RATE: 18 BRPM | HEART RATE: 78 BPM | BODY MASS INDEX: 30.53 KG/M2 | HEIGHT: 66 IN | OXYGEN SATURATION: 94 %

## 2025-09-05 DIAGNOSIS — L08.9 INFECTED INSECT BITE OF ABDOMEN, INITIAL ENCOUNTER: Primary | ICD-10-CM

## 2025-09-05 DIAGNOSIS — S30.861A INFECTED INSECT BITE OF ABDOMEN, INITIAL ENCOUNTER: Primary | ICD-10-CM

## 2025-09-05 RX ORDER — LEVETIRACETAM 750 MG/1
TABLET ORAL
COMMUNITY
Start: 2025-06-04

## 2025-09-05 RX ORDER — MUPIROCIN 2 %
OINTMENT (GRAM) TOPICAL
Qty: 30 G | Refills: 0 | Status: SHIPPED | OUTPATIENT
Start: 2025-09-05

## 2025-09-05 RX ORDER — DOXYCYCLINE HYCLATE 100 MG
100 TABLET ORAL 2 TIMES DAILY
Qty: 14 TABLET | Refills: 0 | Status: SHIPPED | OUTPATIENT
Start: 2025-09-05 | End: 2025-09-12

## 2025-09-05 RX ORDER — LEVETIRACETAM 250 MG/1
TABLET ORAL
COMMUNITY
Start: 2025-08-20

## 2025-09-06 ASSESSMENT — ENCOUNTER SYMPTOMS
DIARRHEA: 0
NAUSEA: 0
VOMITING: 0
COUGH: 0
ABDOMINAL PAIN: 0
WHEEZING: 0
SHORTNESS OF BREATH: 0
SORE THROAT: 0
RHINORRHEA: 0